# Patient Record
Sex: MALE | URBAN - METROPOLITAN AREA
[De-identification: names, ages, dates, MRNs, and addresses within clinical notes are randomized per-mention and may not be internally consistent; named-entity substitution may affect disease eponyms.]

---

## 2023-01-03 ENCOUNTER — OFFICE VISIT (OUTPATIENT)
Dept: URBAN - METROPOLITAN AREA CLINIC 6 | Facility: CLINIC | Age: 68
End: 2023-01-03
Payer: MEDICARE

## 2023-01-03 DIAGNOSIS — H27.01 APHAKIA, RIGHT EYE: Primary | ICD-10-CM

## 2023-01-03 DIAGNOSIS — H25.812 COMBINED FORMS OF AGE-RELATED CATARACT, LEFT EYE: ICD-10-CM

## 2023-01-03 PROCEDURE — 92004 COMPRE OPH EXAM NEW PT 1/>: CPT | Performed by: OPTOMETRIST

## 2023-01-03 ASSESSMENT — INTRAOCULAR PRESSURE
OS: 13
OD: 14

## 2023-01-03 NOTE — IMPRESSION/PLAN
Impression: Aphakia, right eye: H27.01. Plan: s/p CE OD, as per pt. Pt was Rx Ciprofloxacin- Dexamethasone every five hours by surgeon in [facility]. Pt does not wish to return for further visits at that location. I advised to continue Rx drops until next appt with Dr Bijan Garner. Pt is concerned about not seeing out of OD and is requesting a consult with surgeon in our office. Capsular ret?

## 2023-01-19 ENCOUNTER — OFFICE VISIT (OUTPATIENT)
Dept: URBAN - METROPOLITAN AREA CLINIC 6 | Facility: CLINIC | Age: 68
End: 2023-01-19
Payer: MEDICARE

## 2023-01-19 DIAGNOSIS — H25.812 COMBINED FORMS OF AGE-RELATED CATARACT, LEFT EYE: ICD-10-CM

## 2023-01-19 DIAGNOSIS — H11.053 PERIPHERAL PTERYGIUM, STATIONARY, BILATERAL: ICD-10-CM

## 2023-01-19 DIAGNOSIS — T85.29XA OTHER MECHANICAL COMPLICATION OF INTRAOCULAR LENS, INITIAL ENCOUNTER: Primary | ICD-10-CM

## 2023-01-19 DIAGNOSIS — H59.031 CYSTOID MACULAR EDEMA FOLLOWING CATARACT SURGERY, RIGHT EYE: ICD-10-CM

## 2023-01-19 PROCEDURE — 92002 INTRM OPH EXAM NEW PATIENT: CPT | Performed by: OPHTHALMOLOGY

## 2023-01-19 PROCEDURE — 92134 CPTRZ OPH DX IMG PST SGM RTA: CPT | Performed by: OPHTHALMOLOGY

## 2023-01-19 ASSESSMENT — INTRAOCULAR PRESSURE
OD: 12
OS: 14

## 2023-01-19 NOTE — IMPRESSION/PLAN
Impression: Other mechanical complication of intraocular lens, initial encounter: T85.29XA. Plan: s/p complicated CE in [facility] with dislocated PCIOL/ vitreous in [facility]. Refer to Dr. Sade Russell for PPV/IOL exchange vs PPV with [facility] then return here for 2ry IOL. Cont gtts per original surgeon's instructions.

## 2023-01-19 NOTE — IMPRESSION/PLAN
Impression: Peripheral pterygium, stationary, bilateral: H11.053. Plan: Pterygium ou: advised patient to avoid UV exposure with a hat and sunglasses when outdoors. Use artificial tears PRN. Return for any change in size, decreased vision, or other problems or concerns.

## 2023-01-19 NOTE — IMPRESSION/PLAN
Impression: Cystoid macular edema following cataract surgery, right eye: H59.031. Plan: on MOCT today.  Cont dexamethasone gtts QID till sees the retina specialist

## 2023-06-27 NOTE — IMPRESSION/PLAN
Impression: Vitreous hemorrhage, right eye: H43.11. Plan: Emergency visit for Vitreous hemorrhage OD-poor view but able to see retina no tears or hole noted today but not a perfect view. Advised pt to sleep at a elevated angle ~ 45 degrees. The patient was referred to [address] [place] [address] for retinal consult and treatment.

## 2023-06-27 NOTE — IMPRESSION/PLAN
Impression: Cystoid macular edema following cataract surgery, right eye: H59.031. Plan: Emergency visit for CME on ordered MOCT. Plan to start Prednisolone acetate 1% QID until he see s retina. Under the care of Dr Mayra Batista from [facility]. Last appointment was about a week ago and next appointment will be in October 2023-CCrx with her.